# Patient Record
Sex: FEMALE | Race: WHITE | Employment: OTHER | URBAN - METROPOLITAN AREA
[De-identification: names, ages, dates, MRNs, and addresses within clinical notes are randomized per-mention and may not be internally consistent; named-entity substitution may affect disease eponyms.]

---

## 2018-07-06 ENCOUNTER — OFFICE VISIT (OUTPATIENT)
Dept: FAMILY MEDICINE | Facility: CLINIC | Age: 68
End: 2018-07-06
Payer: MEDICARE

## 2018-07-06 VITALS
RESPIRATION RATE: 16 BRPM | TEMPERATURE: 98.5 F | BODY MASS INDEX: 23.96 KG/M2 | HEART RATE: 58 BPM | DIASTOLIC BLOOD PRESSURE: 74 MMHG | WEIGHT: 153 LBS | SYSTOLIC BLOOD PRESSURE: 130 MMHG | OXYGEN SATURATION: 99 %
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
## 2018-07-06 PROCEDURE — 99213 OFFICE O/P EST LOW 20 MIN: CPT | Performed by: FAMILY MEDICINE

## 2018-07-06 RX ORDER — CALCIUM CARBONATE 500(1250)
1 TABLET ORAL 2 TIMES DAILY
COMMUNITY

## 2018-07-06 RX ORDER — MUPIROCIN 20 MG/G
OINTMENT TOPICAL 3 TIMES DAILY
Qty: 22 G | Refills: 1 | Status: SHIPPED | OUTPATIENT
Start: 2018-07-06 | End: 2018-07-11

## 2018-07-06 RX ORDER — CEPHALEXIN 500 MG/1
500 CAPSULE ORAL 3 TIMES DAILY
Qty: 30 CAPSULE | Refills: 0 | Status: SHIPPED | OUTPATIENT
Start: 2018-07-06

## 2018-07-06 ASSESSMENT — ENCOUNTER SYMPTOMS: FEVER: 0

## 2018-07-06 NOTE — Clinical Note
Please abstract the following data from this visit with this patient into the appropriate field in Epic:  Colonoscopy done on this date: 7/2016 (approximately), by this group: [facility], results were found some polyps and h.pylori infection otherwise normal.

## 2018-07-06 NOTE — MR AVS SNAPSHOT
"              After Visit Summary   2018    Kim Delgado    MRN: [id number]           Patient Information     Date Of Birth          1950        Visit Information        Provider Department      2018 2:20 PM Jay Siddiqui MD [facility]        Today's Diagnoses     Bug bite with infection, initial encounter    -  1       Follow-ups after your visit        Follow-up notes from your care team     Return in about 2 weeks (around 2018), or if symptoms worsen or fail to improve.      Who to contact     If you have questions or need follow up information about today's clinic visit or your schedule please contact [facility] directly at [phone].  Normal or non-critical lab and imaging results will be communicated to you by MyChart, letter or phone within 4 business days after the clinic has received the results. If you do not hear from us within 7 days, please contact the clinic through MyChart or phone. If you have a critical or abnormal lab result, we will notify you by phone as soon as possible.  Submit refill requests through [facility] or call your pharmacy and they will forward the refill request to us. Please allow 3 business days for your refill to be completed.          Additional Information About Your Visit        MyChart Information     [employer] lets you send messages to your doctor, view your test results, renew your prescriptions, schedule appointments and more. To sign up, go to [email or web address] . Click on \"Log in\" on the left side of the screen, which will take you to the Welcome page. Then click on \"Sign up Now\" on the right side of the page.     You will be asked to enter the access code listed below, as well as some personal information. Please follow the directions to create your username and password.     Your access code is: [email or web address]  Expires: 10/4/2018  2:49 PM     Your access code will  in 90 days. If you need help or a " new code, please call your [facility] or [phone].        Care EveryWhere ID     This is your Care EveryWhere ID. This could be used by other organizations to access your [place] medical records  [id number]        Your Vitals Were     Pulse Temperature Respirations Pulse Oximetry BMI (Body Mass Index)       58 98.5  F (36.9  C) (Oral) 16 99% 23.96 kg/m2        Blood Pressure from Last 3 Encounters:   07/06/18 130/74   11/04/16 116/74   11/06/13 116/72    Weight from Last 3 Encounters:   07/06/18 153 lb (69.4 kg)   11/04/16 149 lb (67.6 kg)   11/06/13 140 lb (63.5 kg)              Today, you had the following     No orders found for display         Today's Medication Changes          These changes are accurate as of 7/6/18  2:49 PM.  If you have any questions, ask your nurse or doctor.               Start taking these medicines.        Dose/Directions    cephALEXin 500 MG capsule   Commonly known as:  KEFLEX   Used for:  Bug bite with infection, initial encounter   Started by:  Jay Siddiqui MD        Dose:  500 mg   Take 1 capsule (500 mg) by mouth 3 times daily   Quantity:  30 capsule   Refills:  0       mupirocin 2 % ointment   Commonly known as:  BACTROBAN   Used for:  Bug bite with infection, initial encounter   Started by:  Jay Siddiqui MD        Apply topically 3 times daily for 5 days   Quantity:  22 g   Refills:  1            Where to get your medicines      These medications were sent to [facility] [address] [place] [address] [id number] [place] [address] AT [address]  [address] [place] [id number]     Phone:  [phone]     mupirocin 2 % ointment         Some of these will need a paper prescription and others can be bought over the counter.  Ask your nurse if you have questions.     Bring a paper prescription for each of these medications     cephALEXin 500 MG capsule                Primary Care Provider Office Phone # Fax #    [facility]  [phone]       [id number]  [facility] [address]        Equal Access to Services     RACHEL MARS : Hadii aad ku hadmarilynsharon Yelena, wajoseda luqadaha, qaybta kaalmada denzel, aurelia nicolein hayaan elenajaimie mondragon chay russell. So [facility] [phone].    ATENCIÓN: Si habla español, tiene a nunez disposición servicios gratuitos de asistencia lingüística. Llame al [phone].    We comply with applicable federal civil rights laws and [place] laws. We do not discriminate on the basis of race, color, national origin, age, disability, sex, sexual orientation, or gender identity.            Thank you!     Thank you for choosing [facility]  for your care. Our goal is always to provide you with excellent care. Hearing back from our patients is one way we can continue to improve our services. Please take a few minutes to complete the written survey that you may receive in the mail after your visit with us. Thank you!             Your Updated Medication List - Protect others around you: Learn how to safely use, store and throw away your medicines at www.disposemymeds.org.          This list is accurate as of 7/6/18  2:49 PM.  Always use your most recent med list.                   Brand Name Dispense Instructions for use Diagnosis    calcium carbonate 500 MG tablet    OS-AIDA 500 mg [place]. Ca     Take 1 tablet by mouth 2 times daily        cephALEXin 500 MG capsule    KEFLEX    30 capsule    Take 1 capsule (500 mg) by mouth 3 times daily    Bug bite with infection, initial encounter       mupirocin 2 % ointment    BACTROBAN    22 g    Apply topically 3 times daily for 5 days    Bug bite with infection, initial encounter       PROBIOTIC ADVANCED PO

## 2018-07-09 ENCOUNTER — TELEPHONE (OUTPATIENT)
Dept: FAMILY MEDICINE | Facility: CLINIC | Age: 68
End: 2018-07-09

## 2018-07-09 NOTE — TELEPHONE ENCOUNTER
Pt calls, c/o itching, discussed 7/6/18 visit, has not picked up antibiotic yet but plans on today, slow to improve, trying cool compresses with minimal relief, will try topical benadryl and oral benadryl, discussed drowsiness if oral taken, recommend discuss options with pharmacist when picking up rx, call prn  Elissa Perkins, RN, BSN  Message handled by Nurse Triage.

## 2019-04-10 ENCOUNTER — HOSPITAL ENCOUNTER (OUTPATIENT)
Dept: MAMMOGRAPHY | Facility: CLINIC | Age: 69
Discharge: HOME | End: 2019-04-10
Attending: OBSTETRICS & GYNECOLOGY | Admitting: OBSTETRICS & GYNECOLOGY
Payer: MEDICARE

## 2019-04-10 DIAGNOSIS — Z12.31 VISIT FOR SCREENING MAMMOGRAM: ICD-10-CM

## 2019-04-10 PROCEDURE — 77063 BREAST TOMOSYNTHESIS BI: CPT

## 2024-09-23 NOTE — PROGRESS NOTES
HPI      SUBJECTIVE:   Kim Delgado is a 68 year old female who presents to clinic today for the following health issues:      Concern - multiple insect bites(mosquito)  Onset: 10 days, intense pain and itching yesterday     Description:   Red itch, painful bug bites on both of arms     Intensity: moderate, severe    Progression of Symptoms:  worsening    Accompanying Signs & Symptoms:  Some blistery areas, open areas of skin    Previous history of similar problem:   Never this bad     Precipitating factors:   Worsened by: none    Alleviating factors:  Improved by: tylenol helps pain     Therapies Tried and outcome: tylenol      Painful, very itchy - multiple insect bites, lots of mosquitos, [place] and are particularly [place].  Does garden, typically wears long sleeves when she does.  No fever. Feels they are a bit worse.  Is very reluctant to use an oral antibiotic.    Review of Systems   Constitutional: Negative for fever.   Skin: Positive for itching and rash.         Physical Exam   Constitutional: She is well-developed, well-nourished, and in no distress.   Skin:   Extensive insect bites and exocriation (and petechia).  Two bites, one on L forearm and on R wrist appear to be infected with spreading erythema and induration   Vitals reviewed.    (W57.XXXA) Bug bite with infection, initial encounter  (primary encounter diagnosis)  Comment: may use topical benadryl  Plan: cephALEXin (KEFLEX) 500 MG capsule, mupirocin         (BACTROBAN) 2 % ointment              RTC in 1w prn    Jay Siddiqui MD       Provided by Anesthesia Department